# Patient Record
Sex: FEMALE | Race: WHITE | NOT HISPANIC OR LATINO | URBAN - METROPOLITAN AREA
[De-identification: names, ages, dates, MRNs, and addresses within clinical notes are randomized per-mention and may not be internally consistent; named-entity substitution may affect disease eponyms.]

---

## 2017-05-22 ENCOUNTER — INPATIENT (INPATIENT)
Facility: HOSPITAL | Age: 62
LOS: 2 days | Discharge: HOME | DRG: 885 | End: 2017-05-25
Attending: PSYCHIATRY & NEUROLOGY | Admitting: PSYCHIATRY & NEUROLOGY
Payer: MEDICARE

## 2017-05-22 VITALS
RESPIRATION RATE: 18 BRPM | DIASTOLIC BLOOD PRESSURE: 75 MMHG | SYSTOLIC BLOOD PRESSURE: 137 MMHG | HEART RATE: 77 BPM | OXYGEN SATURATION: 98 % | WEIGHT: 166.89 LBS | TEMPERATURE: 98 F

## 2017-05-22 DIAGNOSIS — F33.2 MAJOR DEPRESSIVE DISORDER, RECURRENT SEVERE WITHOUT PSYCHOTIC FEATURES: ICD-10-CM

## 2017-05-22 DIAGNOSIS — R69 ILLNESS, UNSPECIFIED: ICD-10-CM

## 2017-05-22 LAB
ALBUMIN SERPL ELPH-MCNC: 4.1 G/DL — SIGNIFICANT CHANGE UP (ref 3.3–5)
ALP SERPL-CCNC: 109 U/L — SIGNIFICANT CHANGE UP (ref 40–120)
ALT FLD-CCNC: 16 U/L — SIGNIFICANT CHANGE UP (ref 10–45)
ANION GAP SERPL CALC-SCNC: 15 MMOL/L — SIGNIFICANT CHANGE UP (ref 5–17)
APPEARANCE UR: CLEAR — SIGNIFICANT CHANGE UP
AST SERPL-CCNC: 16 U/L — SIGNIFICANT CHANGE UP (ref 10–40)
BACTERIA # UR AUTO: (no result) /HPF
BASOPHILS NFR BLD AUTO: 0.3 % — SIGNIFICANT CHANGE UP (ref 0–2)
BILIRUB SERPL-MCNC: 0.3 MG/DL — SIGNIFICANT CHANGE UP (ref 0.2–1.2)
BILIRUB UR-MCNC: NEGATIVE — SIGNIFICANT CHANGE UP
BUN SERPL-MCNC: 13 MG/DL — SIGNIFICANT CHANGE UP (ref 7–23)
CALCIUM SERPL-MCNC: 9.2 MG/DL — SIGNIFICANT CHANGE UP (ref 8.4–10.5)
CHLORIDE SERPL-SCNC: 100 MMOL/L — SIGNIFICANT CHANGE UP (ref 96–108)
CO2 SERPL-SCNC: 23 MMOL/L — SIGNIFICANT CHANGE UP (ref 22–31)
COLOR SPEC: YELLOW — SIGNIFICANT CHANGE UP
CREAT SERPL-MCNC: 0.9 MG/DL — SIGNIFICANT CHANGE UP (ref 0.5–1.3)
DIFF PNL FLD: (no result)
EPI CELLS # UR: SIGNIFICANT CHANGE UP /HPF
ETHANOL SERPL-MCNC: <10 MG/DL — SIGNIFICANT CHANGE UP (ref 0–10)
GLUCOSE SERPL-MCNC: 124 MG/DL — HIGH (ref 70–99)
GLUCOSE UR QL: NEGATIVE — SIGNIFICANT CHANGE UP
HCT VFR BLD CALC: 41.2 % — SIGNIFICANT CHANGE UP (ref 34.5–45)
HGB BLD-MCNC: 14 G/DL — SIGNIFICANT CHANGE UP (ref 11.5–15.5)
KETONES UR-MCNC: (no result) MG/DL
LEUKOCYTE ESTERASE UR-ACNC: (no result)
LYMPHOCYTES # BLD AUTO: 16.3 % — SIGNIFICANT CHANGE UP (ref 13–44)
MCHC RBC-ENTMCNC: 29.4 PG — SIGNIFICANT CHANGE UP (ref 27–34)
MCHC RBC-ENTMCNC: 34 G/DL — SIGNIFICANT CHANGE UP (ref 32–36)
MCV RBC AUTO: 86.4 FL — SIGNIFICANT CHANGE UP (ref 80–100)
MONOCYTES NFR BLD AUTO: 3.4 % — SIGNIFICANT CHANGE UP (ref 2–14)
NEUTROPHILS NFR BLD AUTO: 80 % — HIGH (ref 43–77)
NITRITE UR-MCNC: NEGATIVE — SIGNIFICANT CHANGE UP
PH UR: 5.5 — SIGNIFICANT CHANGE UP (ref 5–8)
PLATELET # BLD AUTO: 216 K/UL — SIGNIFICANT CHANGE UP (ref 150–400)
POTASSIUM SERPL-MCNC: 3.6 MMOL/L — SIGNIFICANT CHANGE UP (ref 3.5–5.3)
POTASSIUM SERPL-SCNC: 3.6 MMOL/L — SIGNIFICANT CHANGE UP (ref 3.5–5.3)
PROT SERPL-MCNC: 6.7 G/DL — SIGNIFICANT CHANGE UP (ref 6–8.3)
PROT UR-MCNC: NEGATIVE MG/DL — SIGNIFICANT CHANGE UP
RBC # BLD: 4.77 M/UL — SIGNIFICANT CHANGE UP (ref 3.8–5.2)
RBC # FLD: 13.4 % — SIGNIFICANT CHANGE UP (ref 10.3–16.9)
RBC CASTS # UR COMP ASSIST: < 5 /HPF — SIGNIFICANT CHANGE UP
SODIUM SERPL-SCNC: 138 MMOL/L — SIGNIFICANT CHANGE UP (ref 135–145)
SP GR SPEC: 1.01 — SIGNIFICANT CHANGE UP (ref 1–1.03)
TSH SERPL-MCNC: 0.48 UIU/ML — SIGNIFICANT CHANGE UP (ref 0.35–4.94)
UROBILINOGEN FLD QL: 0.2 E.U./DL — SIGNIFICANT CHANGE UP
WBC # BLD: 7.7 K/UL — SIGNIFICANT CHANGE UP (ref 3.8–10.5)
WBC # FLD AUTO: 7.7 K/UL — SIGNIFICANT CHANGE UP (ref 3.8–10.5)
WBC UR QL: (no result) /HPF

## 2017-05-22 PROCEDURE — 93010 ELECTROCARDIOGRAM REPORT: CPT

## 2017-05-22 PROCEDURE — 99285 EMERGENCY DEPT VISIT HI MDM: CPT | Mod: 25

## 2017-05-22 PROCEDURE — 90792 PSYCH DIAG EVAL W/MED SRVCS: CPT

## 2017-05-22 RX ORDER — LEVOTHYROXINE SODIUM 125 MCG
50 TABLET ORAL DAILY
Qty: 0 | Refills: 0 | Status: DISCONTINUED | OUTPATIENT
Start: 2017-05-22 | End: 2017-05-23

## 2017-05-22 RX ORDER — PANTOPRAZOLE SODIUM 20 MG/1
40 TABLET, DELAYED RELEASE ORAL
Qty: 0 | Refills: 0 | Status: DISCONTINUED | OUTPATIENT
Start: 2017-05-22 | End: 2017-05-22

## 2017-05-22 RX ORDER — CLONAZEPAM 1 MG
1.5 TABLET ORAL AT BEDTIME
Qty: 0 | Refills: 0 | Status: DISCONTINUED | OUTPATIENT
Start: 2017-05-22 | End: 2017-05-25

## 2017-05-22 RX ORDER — LANOLIN ALCOHOL/MO/W.PET/CERES
10 CREAM (GRAM) TOPICAL AT BEDTIME
Qty: 0 | Refills: 0 | Status: DISCONTINUED | OUTPATIENT
Start: 2017-05-22 | End: 2017-05-22

## 2017-05-22 RX ORDER — PANTOPRAZOLE SODIUM 20 MG/1
40 TABLET, DELAYED RELEASE ORAL
Qty: 0 | Refills: 0 | Status: DISCONTINUED | OUTPATIENT
Start: 2017-05-22 | End: 2017-05-25

## 2017-05-22 RX ORDER — LANOLIN ALCOHOL/MO/W.PET/CERES
6 CREAM (GRAM) TOPICAL AT BEDTIME
Qty: 0 | Refills: 0 | Status: DISCONTINUED | OUTPATIENT
Start: 2017-05-22 | End: 2017-05-25

## 2017-05-22 RX ORDER — CLONAZEPAM 1 MG
1.5 TABLET ORAL DAILY
Qty: 0 | Refills: 0 | Status: DISCONTINUED | OUTPATIENT
Start: 2017-05-22 | End: 2017-05-25

## 2017-05-22 RX ORDER — LANOLIN ALCOHOL/MO/W.PET/CERES
9 CREAM (GRAM) TOPICAL AT BEDTIME
Qty: 0 | Refills: 0 | Status: DISCONTINUED | OUTPATIENT
Start: 2017-05-22 | End: 2017-05-22

## 2017-05-22 RX ORDER — CITALOPRAM 10 MG/1
1 TABLET, FILM COATED ORAL
Qty: 0 | Refills: 0 | COMMUNITY

## 2017-05-22 RX ORDER — LIOTHYRONINE SODIUM 25 UG/1
5 TABLET ORAL DAILY
Qty: 0 | Refills: 0 | Status: DISCONTINUED | OUTPATIENT
Start: 2017-05-22 | End: 2017-05-23

## 2017-05-22 RX ORDER — OXCARBAZEPINE 300 MG/1
300 TABLET, FILM COATED ORAL AT BEDTIME
Qty: 0 | Refills: 0 | Status: DISCONTINUED | OUTPATIENT
Start: 2017-05-22 | End: 2017-05-25

## 2017-05-22 RX ORDER — CARBAMAZEPINE 200 MG
300 TABLET ORAL AT BEDTIME
Qty: 0 | Refills: 0 | Status: DISCONTINUED | OUTPATIENT
Start: 2017-05-22 | End: 2017-05-22

## 2017-05-22 RX ORDER — CITALOPRAM 10 MG/1
40 TABLET, FILM COATED ORAL AT BEDTIME
Qty: 0 | Refills: 0 | Status: DISCONTINUED | OUTPATIENT
Start: 2017-05-22 | End: 2017-05-25

## 2017-05-22 RX ADMIN — OXCARBAZEPINE 300 MILLIGRAM(S): 300 TABLET, FILM COATED ORAL at 22:48

## 2017-05-22 RX ADMIN — PANTOPRAZOLE SODIUM 40 MILLIGRAM(S): 20 TABLET, DELAYED RELEASE ORAL at 22:45

## 2017-05-22 RX ADMIN — Medication 1.5 MILLIGRAM(S): at 22:44

## 2017-05-22 RX ADMIN — CITALOPRAM 40 MILLIGRAM(S): 10 TABLET, FILM COATED ORAL at 22:47

## 2017-05-22 NOTE — ED ADULT TRIAGE NOTE - CHIEF COMPLAINT QUOTE
"I do not want to be alive.  Today is the 1 year anniversary of my 's death. I've been feeling depressed for the last few weeks."  Denies HI.

## 2017-05-22 NOTE — ED ADULT NURSE NOTE - CHPI ED SYMPTOMS NEG
no change in level of consciousness/no agitation/no weakness/no hallucinations/no paranoia/no disorientation/no weight loss/no homicidal

## 2017-05-22 NOTE — ED ADULT NURSE NOTE - OBJECTIVE STATEMENT
Pt came to ED complaining of suicidal ideation. Pt reports depression and anxiety following death of her  one year ago. Pt states she wants to die, but does not have plan or attempted suicide. Pt states she needs help.  PT is alert and oriented x3. Lung sounds clear bilaterally. Pt denies all other medical complaints at this time. Pt is ambulatory with even gait.

## 2017-05-22 NOTE — ED BEHAVIORAL HEALTH ASSESSMENT NOTE - OTHER PAST PSYCHIATRIC HISTORY (INCLUDE DETAILS REGARDING ONSET, COURSE OF ILLNESS, INPATIENT/OUTPATIENT TREATMENT)
As above.  Three prior inpt psych admits:   #1 ~2008 at [facility] x 1 day secondary to bad reaction to Abilify.   #2 ~2009 at [facility] for rx of MDD  #3 ~? at [facility]    Pt saw Dr. Carlos Bone, retired [facility] outpt psychopharmacologist x years. She has been rxed on

## 2017-05-22 NOTE — ED PROVIDER NOTE - OBJECTIVE STATEMENT
60 y/o f with PMH of hypothyroid and major depression presents to ED with worsening depression sent by psychiatrist Dr. Nolan.  Symptoms exacerbated by anniversary of death of her .  Pt has expressed suicidal thoughts in past.  Hx of multiple psych admissions.

## 2017-05-22 NOTE — ED BEHAVIORAL HEALTH ASSESSMENT NOTE - DETAILS
Passive with passive ideation; no intent or plan Discussed with Dr. Ruiz and 8 [facility] team D/W Dr. Gasca Mother ?anxiety  ?depression

## 2017-05-22 NOTE — ED BEHAVIORAL HEALTH ASSESSMENT NOTE - DESCRIPTION
Calm, cooperative. Hashimoto's thyroiditis; GERD Born in [facility]. Family members are Holocaust survivors. [place] 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  last year after 44 years of marriage. 2 adult daughters, both .

## 2017-05-22 NOTE — ED BEHAVIORAL HEALTH ASSESSMENT NOTE - MEDICAL ISSUES AND PLAN (INCLUDE STANDING AND PRN MEDICATION)
Pt with hx of Hashimoto's thyroiditis. Rx is unclear. Will defer rx until medicine consult confirmed with outpt endocrinologist. Pt with hx of Hashimoto's thyroiditis. Rx is unclear. Will defer rx until confirmed from pharmacy. Endocrine consult.

## 2017-05-22 NOTE — ED BEHAVIORAL HEALTH ASSESSMENT NOTE - PAST PSYCHOTROPIC MEDICATION
Per outpt psychiatrist: Depakote, Bupropion, Cymbalta, Venlafaxine, Fluoxetine, Quietiapine, Abilify, Risperidone, Mirtazapine, Doxepin

## 2017-05-22 NOTE — ED BEHAVIORAL HEALTH ASSESSMENT NOTE - RISK ASSESSMENT
Pt with passive SI, no intent or plan. Pt with no hx of prior suicide attempts. Pt requesting admission. Is low-moderate risk for self harm once admitted. No current need for C.O.

## 2017-05-22 NOTE — ED BEHAVIORAL HEALTH ASSESSMENT NOTE - HPI (INCLUDE ILLNESS QUALITY, SEVERITY, DURATION, TIMING, CONTEXT, MODIFYING FACTORS, ASSOCIATED SIGNS AND SYMPTOMS)
61-year-old  [facility] Mand[employer] female with long hx of MDD, 3 prior inpt admits, now c/o increasing depression, passive suicidal ideation, in context of 1st anniversary of 's death. 61-year-old  [facility] Religious female with long hx of MDD, 3 prior inpt admits, now c/o increasing depression, passive suicidal ideation, in context of 1st anniversary of 's death.  Pt interviewed in presence of her two daughters, who corroborated information and also provided some additional data. Pt's outpt psychiatrist, Dr. Nolan, also provided collateral as well as recommendations.  Per daughters, pt has not been doing well since the death of her  of 44 years last May.   Per Dr Nolan, pt has been on multiple antidepressants, antipsychotics and mood stabilizers (most predating his treatment). He feels in addition to depression and bereavement, pt also has personality disorder. He is hoping pt can have ECT as an inpt.

## 2017-05-22 NOTE — ED BEHAVIORAL HEALTH ASSESSMENT NOTE - SUMMARY
61-year-old female with long hx of MDD, 3 prior inpt psych admits, multiple medication trials, now with worsening depression in context of first anniversary of 's death. Pt's outpt psychiatrist advised her to come to ED, also told her he would like her to get ECT. Pt currently states she will not agree to ECT. For now, recommend admitting pt for observation, I/G/M tx and possible medication adjustment.  Pt's outpt psychiatrist has also wanted to reduce her clonazepam doses, but she has been resistant to changing dose. Would consider slight decrease in dose, though during this acute episode, it might be better to defer this.

## 2017-05-22 NOTE — ED BEHAVIORAL HEALTH ASSESSMENT NOTE - CURRENT MEDICATION
Oxcarbazepine 300 mg po qhs, celexa 40 mg po qhs, clonazepam 1.5 mg po q AM and qhs, melatonin 10 mg po qhs.  Pt also takes synthroid ?50 micrograms po qAM, ?75 micrograms po q weekly (Mondays--didn't take today), ?liothyroxine 2.5 mg po q AM, pantoprazole 40 mg po qAM and qhs, ?hydrocortisone 12.5 mg po qAM (needs to be confirmed).

## 2017-05-23 DIAGNOSIS — E06.3 AUTOIMMUNE THYROIDITIS: ICD-10-CM

## 2017-05-23 DIAGNOSIS — E27.40 UNSPECIFIED ADRENOCORTICAL INSUFFICIENCY: ICD-10-CM

## 2017-05-23 DIAGNOSIS — K21.9 GASTRO-ESOPHAGEAL REFLUX DISEASE WITHOUT ESOPHAGITIS: ICD-10-CM

## 2017-05-23 RX ORDER — HYDROCORTISONE 20 MG
15 TABLET ORAL DAILY
Qty: 0 | Refills: 0 | Status: DISCONTINUED | OUTPATIENT
Start: 2017-05-23 | End: 2017-05-25

## 2017-05-23 RX ORDER — LIOTHYRONINE SODIUM 25 UG/1
2.5 TABLET ORAL DAILY
Qty: 0 | Refills: 0 | Status: DISCONTINUED | OUTPATIENT
Start: 2017-05-24 | End: 2017-05-25

## 2017-05-23 RX ORDER — LEVOTHYROXINE SODIUM 125 MCG
75 TABLET ORAL
Qty: 0 | Refills: 0 | Status: CANCELLED | OUTPATIENT
Start: 2017-05-28 | End: 2017-05-25

## 2017-05-23 RX ORDER — LEVOTHYROXINE SODIUM 125 MCG
50 TABLET ORAL DAILY
Qty: 0 | Refills: 0 | Status: DISCONTINUED | OUTPATIENT
Start: 2017-05-24 | End: 2017-05-24

## 2017-05-23 RX ADMIN — OXCARBAZEPINE 300 MILLIGRAM(S): 300 TABLET, FILM COATED ORAL at 22:31

## 2017-05-23 RX ADMIN — Medication 50 MICROGRAM(S): at 07:03

## 2017-05-23 RX ADMIN — Medication 15 MILLIGRAM(S): at 17:17

## 2017-05-23 RX ADMIN — Medication 6 MILLIGRAM(S): at 23:11

## 2017-05-23 RX ADMIN — Medication 1.5 MILLIGRAM(S): at 22:32

## 2017-05-23 RX ADMIN — CITALOPRAM 40 MILLIGRAM(S): 10 TABLET, FILM COATED ORAL at 22:32

## 2017-05-23 RX ADMIN — LIOTHYRONINE SODIUM 5 MICROGRAM(S): 25 TABLET ORAL at 07:50

## 2017-05-23 RX ADMIN — Medication 1.5 MILLIGRAM(S): at 11:28

## 2017-05-23 RX ADMIN — PANTOPRAZOLE SODIUM 40 MILLIGRAM(S): 20 TABLET, DELAYED RELEASE ORAL at 07:03

## 2017-05-23 RX ADMIN — PANTOPRAZOLE SODIUM 40 MILLIGRAM(S): 20 TABLET, DELAYED RELEASE ORAL at 22:32

## 2017-05-23 NOTE — BEHAVIORAL HEALTH ASSESSMENT NOTE - NSBHSUICPROTECTFACT_PSY_A_CORE
Identifies reasons for living/Responsibility to family and others/High spirituality/Supportive social network or family

## 2017-05-23 NOTE — BEHAVIORAL HEALTH ASSESSMENT NOTE - RISK ASSESSMENT
Static risk factors:   Modifiable risk factors: loneliness  Protective factors: Cheondoism, involved family, close relationship with grandchildren, identified reasons for living, stable income

## 2017-05-23 NOTE — BEHAVIORAL HEALTH ASSESSMENT NOTE - SUMMARY
Patient is a 61 year-old [facility] Gnosticism female with MDD and 3 past hospitalizations brought in by self with daughters referred by outpatient psychiatrist for ECT and severe depression in the context of the anniversary of her 's passing on 5/21/17. Patient requires inpatient hospitalization for medication stabilization, safety, and discharge planning.

## 2017-05-23 NOTE — BEHAVIORAL HEALTH ASSESSMENT NOTE - OTHER PAST PSYCHIATRIC HISTORY (INCLUDE DETAILS REGARDING ONSET, COURSE OF ILLNESS, INPATIENT/OUTPATIENT TREATMENT)
Hospitalized at [facility] in 2008 after having a "bad reaction" to Abilify. Hospitalized twice in 2009 at [facility].

## 2017-05-23 NOTE — BEHAVIORAL HEALTH ASSESSMENT NOTE - PROBLEM SELECTOR PLAN 1
Continue Celexa 40mg po daily  Continue Klonopin 1.5mg po bid  Continue Atarax 25mg po daily prn anxiety  Will reattempt conversation with patient about ECT

## 2017-05-23 NOTE — BEHAVIORAL HEALTH ASSESSMENT NOTE - PAST PSYCHOTROPIC MEDICATION
Depakote, buproprion, Cymbalta, venlafaxine, fluoxetine, quetiapine, Abilify, risperidone, mirtazapine, doxepin

## 2017-05-23 NOTE — BEHAVIORAL HEALTH ASSESSMENT NOTE - NSBHADMITIPSTRENGTH_PSY_A_CORE
Has vocational interests or hobbies/Has supportive interpersonal relationships with family, friends or peers/Has access to housing/residential stability/Financial stability/Motivated and ready for change/Involved in cultural/spiritual/Mu-ism/community activities

## 2017-05-23 NOTE — BEHAVIORAL HEALTH ASSESSMENT NOTE - PROBLEM SELECTOR PLAN 2
Continue Synthroid 50mcg po qam and an additional 75mcg po qweek on Sundays  Continue Cytomel 2.5mcg po daily

## 2017-05-23 NOTE — BEHAVIORAL HEALTH ASSESSMENT NOTE - CASE SUMMARY
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 Pt interviewed in presence of her two daughters, who corroborated information and also provided some additional data. Pt's outpt psychiatrist, Dr. Nolan, also provided collateral as well as recommendations. Per daughters, pt has not been doing well since the death of her  of 44 years last May.  Per Dr Nolan, pt has been on multiple antidepressants, antipsychotics and mood stabilizers (most predating his treatment). He feels in addition to depression and bereavement, pt also has personality disorder. He is hoping pt can have ECT as an inpt.   Patient at this time focused on her thyroid status; does not endorse active SI but is tearful, depressed, somatically preoccupied and very anxious.  Need to address with patient issue of ECT and Klonopin taper ; consider alternative approaches e.g. medication modifications.

## 2017-05-23 NOTE — BEHAVIORAL HEALTH ASSESSMENT NOTE - DETAILS
told outpatient psychiatrist she doesn't want to be alive, but states she wanted help Abilify - "bad reaction" father attempted suicide by overdose

## 2017-05-23 NOTE — BEHAVIORAL HEALTH ASSESSMENT NOTE - MODIFICATIONS
continue assessment; assess thyroid status; complete as possible contact with past treater (may only be available Mon and Thurs).

## 2017-05-23 NOTE — BEHAVIORAL HEALTH ASSESSMENT NOTE - HPI (INCLUDE ILLNESS QUALITY, SEVERITY, DURATION, TIMING, CONTEXT, MODIFYING FACTORS, ASSOCIATED SIGNS AND SYMPTOMS)
Patient is a 61 [facility] with [facility] and 3 past hospitalizations brought in by self with daughters referred by outpatient psychiatrist for ECT and severe depression in the context of the anniversary of her 's passing on 17. Patient states she has been overcome with grief since her  passed 1 year ago and it has not improved since. She [place] on  to visit his grave and thought she just wanted to be with him and did not want to go on any longer. Patient saw her outpatient psychiatrist Dr. Nolan on Monday and told him she did not want to be alive anymore and wanted help. Dr. Nolan referred her to [facility] for further treatment and ECT.  Patient describes "3 tragedies" that have greatly affected her: the deaths of her parents and . Patient expressed that her parents lived in [place] and she lived in [place]; she found out they each had  several days after the fact and she still feels remorse that she did not see them before they passed. Patient blames herself in part for her 's death. He had many cardiac issues and believes she should have pushed for a pacemaker placement sooner. She wonders if she had been more aggressive about his treatment if he would still be alive today.   Patient states that she has become very lonely. She has an aide that comes for 4 hours per day, but this is not enough. She does not want to rely too much on her daughters as they have their own families and she sees them on weekends. She reports feeling unmotivated and increasingly depressed and anxious. She has not been sleeping well and has decreased appetite. She denies auditory/visual hallucinations. Denies current suicidal or homicidal ideation. Patient states she does not want ECT as her "case is very complicated" due to her thyroid issue.

## 2017-05-24 DIAGNOSIS — R30.9 PAINFUL MICTURITION, UNSPECIFIED: ICD-10-CM

## 2017-05-24 LAB
APPEARANCE UR: CLEAR — SIGNIFICANT CHANGE UP
APPEARANCE UR: CLEAR — SIGNIFICANT CHANGE UP
BILIRUB UR-MCNC: NEGATIVE — SIGNIFICANT CHANGE UP
BILIRUB UR-MCNC: NEGATIVE — SIGNIFICANT CHANGE UP
COLOR SPEC: YELLOW — SIGNIFICANT CHANGE UP
COLOR SPEC: YELLOW — SIGNIFICANT CHANGE UP
DIFF PNL FLD: (no result)
DIFF PNL FLD: (no result)
GLUCOSE UR QL: NEGATIVE — SIGNIFICANT CHANGE UP
GLUCOSE UR QL: NEGATIVE — SIGNIFICANT CHANGE UP
KETONES UR-MCNC: NEGATIVE — SIGNIFICANT CHANGE UP
KETONES UR-MCNC: NEGATIVE — SIGNIFICANT CHANGE UP
LEUKOCYTE ESTERASE UR-ACNC: (no result)
LEUKOCYTE ESTERASE UR-ACNC: (no result)
NITRITE UR-MCNC: NEGATIVE — SIGNIFICANT CHANGE UP
NITRITE UR-MCNC: NEGATIVE — SIGNIFICANT CHANGE UP
PH UR: 5 — SIGNIFICANT CHANGE UP (ref 5–8)
PH UR: 5 — SIGNIFICANT CHANGE UP (ref 5–8)
PROT UR-MCNC: NEGATIVE MG/DL — SIGNIFICANT CHANGE UP
PROT UR-MCNC: NEGATIVE MG/DL — SIGNIFICANT CHANGE UP
SP GR SPEC: 1.01 — SIGNIFICANT CHANGE UP (ref 1–1.03)
SP GR SPEC: <=1.005 — SIGNIFICANT CHANGE UP (ref 1–1.03)
T4 AB SER-ACNC: 5.33 UG/DL — SIGNIFICANT CHANGE UP (ref 3.17–11.72)
T4 FREE SERPL-MCNC: 0.91 NG/DL — SIGNIFICANT CHANGE UP (ref 0.7–1.48)
UROBILINOGEN FLD QL: 0.2 E.U./DL — SIGNIFICANT CHANGE UP
UROBILINOGEN FLD QL: 0.2 E.U./DL — SIGNIFICANT CHANGE UP

## 2017-05-24 RX ORDER — LEVOTHYROXINE SODIUM 125 MCG
50 TABLET ORAL
Qty: 0 | Refills: 0 | Status: DISCONTINUED | OUTPATIENT
Start: 2017-05-24 | End: 2017-05-25

## 2017-05-24 RX ADMIN — Medication 1.5 MILLIGRAM(S): at 21:59

## 2017-05-24 RX ADMIN — PANTOPRAZOLE SODIUM 40 MILLIGRAM(S): 20 TABLET, DELAYED RELEASE ORAL at 07:08

## 2017-05-24 RX ADMIN — Medication 15 MILLIGRAM(S): at 18:07

## 2017-05-24 RX ADMIN — LIOTHYRONINE SODIUM 2.5 MICROGRAM(S): 25 TABLET ORAL at 07:14

## 2017-05-24 RX ADMIN — CITALOPRAM 40 MILLIGRAM(S): 10 TABLET, FILM COATED ORAL at 21:58

## 2017-05-24 RX ADMIN — OXCARBAZEPINE 300 MILLIGRAM(S): 300 TABLET, FILM COATED ORAL at 21:58

## 2017-05-24 RX ADMIN — Medication 1.5 MILLIGRAM(S): at 10:46

## 2017-05-24 RX ADMIN — Medication 50 MICROGRAM(S): at 07:14

## 2017-05-24 RX ADMIN — PANTOPRAZOLE SODIUM 40 MILLIGRAM(S): 20 TABLET, DELAYED RELEASE ORAL at 22:00

## 2017-05-24 NOTE — PROGRESS NOTE BEHAVIORAL HEALTH - PROBLEM SELECTOR PLAN 2
Continue Synthroid 50mcg po qam and an 75mcg po qweek on Sundays  Continue Cytomel 2.5mcg po daily. Continue Synthroid 50mcg po qam Mon-Sat and 75mcg po qweek on Sundays  Continue Cytomel 2.5mcg po daily.  Endocrine consulted, will appreciate recommendations

## 2017-05-24 NOTE — PROGRESS NOTE BEHAVIORAL HEALTH - PROBLEM SELECTOR PLAN 1
Continue Celexa 40mg po daily  Continue Klonopin 1.5mg po bid  Continue Atarax 25mg po daily prn anxiety

## 2017-05-24 NOTE — PROGRESS NOTE BEHAVIORAL HEALTH - RISK ASSESSMENT
Risk Assessment Static risk factors:   Modifiable risk factors: loneliness  Protective factors: Pentecostalism, involved family, close relationship with grandchildren, identified reasons for living, stable income.

## 2017-05-24 NOTE — PROGRESS NOTE BEHAVIORAL HEALTH - CASE SUMMARY
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 Pt interviewed in presence of her two daughters, who corroborated information and also provided some additional data. Pt's outpt psychiatrist, Dr. Nolan, also provided collateral as well as recommendations. Per daughters, pt has not been doing well since the death of her  of 44 years last May.  Per Dr Nolan, pt has been on multiple antidepressants, antipsychotics and mood stabilizers (most predating his treatment). He feels in addition to depression and bereavement, pt also has personality disorder. He is hoping pt can have ECT as an inpt.	  Patient demonstrates sad mood and anxiety but no SI.  Met with patient and her daughter. to review tx plan.  Patient is rejecting ECT out of hand and does not appear to wish to stay for completion of a Klonopin taper.

## 2017-05-24 NOTE — PROGRESS NOTE BEHAVIORAL HEALTH - SUMMARY
Patient is a 61 year-old [facility] Bapt[employer] female with MDD and 3 past hospitalizations brought in by self with daughters referred by outpatient psychiatrist for ECT and severe depression in the context of the anniversary of her 's passing on 5/21/17. Patient requires inpatient hospitalization for medication stabilization, safety, and discharge planning.

## 2017-05-25 VITALS
DIASTOLIC BLOOD PRESSURE: 82 MMHG | HEART RATE: 76 BPM | TEMPERATURE: 98 F | RESPIRATION RATE: 18 BRPM | SYSTOLIC BLOOD PRESSURE: 130 MMHG

## 2017-05-25 LAB
CORTIS AM PEAK SERPL-MCNC: 11.1 UG/DL — SIGNIFICANT CHANGE UP (ref 3.9–37.5)
T3 SERPL-MCNC: 93 NG/DL — SIGNIFICANT CHANGE UP (ref 80–200)

## 2017-05-25 PROCEDURE — 99222 1ST HOSP IP/OBS MODERATE 55: CPT

## 2017-05-25 RX ORDER — LEVOTHYROXINE SODIUM 125 MCG
1 TABLET ORAL
Qty: 0 | Refills: 0 | COMMUNITY

## 2017-05-25 RX ORDER — LIOTHYRONINE SODIUM 25 UG/1
1 TABLET ORAL
Qty: 0 | Refills: 0 | COMMUNITY

## 2017-05-25 RX ORDER — CLONAZEPAM 1 MG
3 TABLET ORAL
Qty: 0 | Refills: 0 | COMMUNITY
Start: 2017-05-25

## 2017-05-25 RX ORDER — CLONAZEPAM 1 MG
0 TABLET ORAL
Qty: 0 | Refills: 0 | COMMUNITY

## 2017-05-25 RX ORDER — LANOLIN ALCOHOL/MO/W.PET/CERES
2 CREAM (GRAM) TOPICAL
Qty: 0 | Refills: 0 | COMMUNITY
Start: 2017-05-25

## 2017-05-25 RX ORDER — PANTOPRAZOLE SODIUM 20 MG/1
1 TABLET, DELAYED RELEASE ORAL
Qty: 0 | Refills: 0 | COMMUNITY
Start: 2017-05-25

## 2017-05-25 RX ORDER — LEVOTHYROXINE SODIUM 125 MCG
1 TABLET ORAL
Qty: 0 | Refills: 0 | COMMUNITY
Start: 2017-05-25

## 2017-05-25 RX ORDER — OXCARBAZEPINE 300 MG/1
1 TABLET, FILM COATED ORAL
Qty: 14 | Refills: 0 | OUTPATIENT
Start: 2017-05-25 | End: 2017-06-08

## 2017-05-25 RX ORDER — PANTOPRAZOLE SODIUM 20 MG/1
0 TABLET, DELAYED RELEASE ORAL
Qty: 0 | Refills: 0 | COMMUNITY

## 2017-05-25 RX ORDER — LIOTHYRONINE SODIUM 25 UG/1
0.5 TABLET ORAL
Qty: 15 | Refills: 0 | OUTPATIENT
Start: 2017-05-25 | End: 2017-06-24

## 2017-05-25 RX ADMIN — Medication 1.5 MILLIGRAM(S): at 10:32

## 2017-05-25 RX ADMIN — Medication 1 TABLET(S): at 12:33

## 2017-05-25 RX ADMIN — Medication 50 MICROGRAM(S): at 07:07

## 2017-05-25 RX ADMIN — PANTOPRAZOLE SODIUM 40 MILLIGRAM(S): 20 TABLET, DELAYED RELEASE ORAL at 07:07

## 2017-05-25 RX ADMIN — LIOTHYRONINE SODIUM 2.5 MICROGRAM(S): 25 TABLET ORAL at 10:33

## 2017-05-25 NOTE — PROGRESS NOTE BEHAVIORAL HEALTH - NSBHFUPINTERVALHXFT_PSY_A_CORE
Per nursing report, patient took melatonin 6mg last night for sleep, with good effect. Patient has otherwise been calm and cooperative with treatment.  On examination today, patient reported that she felt anxious in the morning. She "likes to be around people" and enjoys going to groups, particularly music groups. Patient still endorses depression, but denies SI. When the topic of ECT was discussed, she stated that "ECT is out of the question" and did not want to discuss it again. She does not want to "mess with" her brain. Patient also discussed how she has left many doctors' practices when she does not agree with treatment modality or if they respond to her in a way she does not like. Denied AH/VH.  Later met with patient and daughter. Patient is unhappy with her treatment on the unit. Patient restated that she does not want ECT and would only like medications to be changed.
Per nursing report, patient has been visible and social on the unit. She has been pleasant on approach. Her birthday was celebrated last night. She submitted a 72-hour letter yesterday requesting discharge.  On examination, patient stated she wants to be discharged. She still feels depressed, but is not suicidal. She plans to return to see her outpatient psychiatrist. She is motivated to pursue treatment and get involved in bereavement groups in [place]. She denies HI/AH/VH. No delusions verbalized.  Endocrine consulted, no recommendations. Medicine consulted for dysuria, recommended Bactrim DS.

## 2017-05-25 NOTE — PROGRESS NOTE BEHAVIORAL HEALTH - NSBHATTESTSEENBY_PSY_A_CORE
Attending Psychiatrist supervising NP/Trainee, meeting pt... attending Psychiatrist without NP/Trainee

## 2017-05-25 NOTE — PROGRESS NOTE BEHAVIORAL HEALTH - SUMMARY
[facility] is [facility] 61 [facility] with [facility] and 3 past hospitalizations brought in by self with daughters referred by outpatient psychiatrist for ECT and severe depression in the context of the anniversary of her 's passing on 5/21/17. Patient requested discharge and does not meet criteria for involuntary admission.

## 2017-05-26 LAB
-  AMPICILLIN/SULBACTAM: SIGNIFICANT CHANGE UP
-  AMPICILLIN: SIGNIFICANT CHANGE UP
-  CEFAZOLIN: SIGNIFICANT CHANGE UP
-  CEFTRIAXONE: SIGNIFICANT CHANGE UP
-  CIPROFLOXACIN: SIGNIFICANT CHANGE UP
-  GENTAMICIN: SIGNIFICANT CHANGE UP
-  NITROFURANTOIN: SIGNIFICANT CHANGE UP
-  PIPERACILLIN/TAZOBACTAM: SIGNIFICANT CHANGE UP
-  TOBRAMYCIN: SIGNIFICANT CHANGE UP
-  TRIMETHOPRIM/SULFAMETHOXAZOLE: SIGNIFICANT CHANGE UP
CULTURE RESULTS: SIGNIFICANT CHANGE UP
METHOD TYPE: SIGNIFICANT CHANGE UP
ORGANISM # SPEC MICROSCOPIC CNT: SIGNIFICANT CHANGE UP
ORGANISM # SPEC MICROSCOPIC CNT: SIGNIFICANT CHANGE UP
SPECIMEN SOURCE: SIGNIFICANT CHANGE UP

## 2017-05-28 DIAGNOSIS — F41.9 ANXIETY DISORDER, UNSPECIFIED: ICD-10-CM

## 2017-05-28 DIAGNOSIS — E06.3 AUTOIMMUNE THYROIDITIS: ICD-10-CM

## 2017-05-28 DIAGNOSIS — F33.2 MAJOR DEPRESSIVE DISORDER, RECURRENT SEVERE WITHOUT PSYCHOTIC FEATURES: ICD-10-CM

## 2017-05-28 DIAGNOSIS — E27.40 UNSPECIFIED ADRENOCORTICAL INSUFFICIENCY: ICD-10-CM

## 2017-05-28 DIAGNOSIS — N39.0 URINARY TRACT INFECTION, SITE NOT SPECIFIED: ICD-10-CM

## 2017-06-16 PROCEDURE — 87186 SC STD MICRODIL/AGAR DIL: CPT

## 2017-06-16 PROCEDURE — 87086 URINE CULTURE/COLONY COUNT: CPT

## 2017-06-16 PROCEDURE — 84480 ASSAY TRIIODOTHYRONINE (T3): CPT

## 2017-06-16 PROCEDURE — 85025 COMPLETE CBC W/AUTO DIFF WBC: CPT

## 2017-06-16 PROCEDURE — 99285 EMERGENCY DEPT VISIT HI MDM: CPT | Mod: 25

## 2017-06-16 PROCEDURE — 84439 ASSAY OF FREE THYROXINE: CPT

## 2017-06-16 PROCEDURE — 93005 ELECTROCARDIOGRAM TRACING: CPT

## 2017-06-16 PROCEDURE — 80307 DRUG TEST PRSMV CHEM ANLYZR: CPT

## 2017-06-16 PROCEDURE — 81001 URINALYSIS AUTO W/SCOPE: CPT

## 2017-06-16 PROCEDURE — 36415 COLL VENOUS BLD VENIPUNCTURE: CPT

## 2017-06-16 PROCEDURE — 82533 TOTAL CORTISOL: CPT

## 2017-06-16 PROCEDURE — 84443 ASSAY THYROID STIM HORMONE: CPT

## 2017-06-16 PROCEDURE — 80053 COMPREHEN METABOLIC PANEL: CPT

## 2017-06-16 PROCEDURE — 84436 ASSAY OF TOTAL THYROXINE: CPT

## 2022-07-24 NOTE — ED BEHAVIORAL HEALTH ASSESSMENT NOTE - NS ED B[facility] MED ROS MUSCULOSKELETAL
COVID-19 positive TM intake On [employer] log.     Active monitoring and sx monitoring dc'd.  Return to work, as directed in the positive letter that was previously sent to tm and manager.    
No complaints

## 2023-07-31 NOTE — ED ADULT NURSE NOTE - NS ED NURSE LEVEL OF CONSCIOUSNESS ORIENTATION
Oriented - self; Oriented - place; Oriented - time I will STOP taking the medications listed below when I get home from the hospital:  None